# Patient Record
Sex: FEMALE | Race: WHITE | URBAN - METROPOLITAN AREA
[De-identification: names, ages, dates, MRNs, and addresses within clinical notes are randomized per-mention and may not be internally consistent; named-entity substitution may affect disease eponyms.]

---

## 2021-04-06 ENCOUNTER — OFFICE VISIT (OUTPATIENT)
Dept: URBAN - METROPOLITAN AREA CLINIC 82 | Facility: CLINIC | Age: 26
End: 2021-04-06
Payer: COMMERCIAL

## 2021-04-06 DIAGNOSIS — H52.13 MYOPIA, BILATERAL: Primary | ICD-10-CM

## 2021-04-06 PROCEDURE — 92004 COMPRE OPH EXAM NEW PT 1/>: CPT | Performed by: OPTOMETRIST

## 2021-04-06 ASSESSMENT — VISUAL ACUITY
OS: 20/20
OD: 20/20

## 2021-04-06 ASSESSMENT — KERATOMETRY
OS: 45.50
OD: 45.38

## 2021-04-06 ASSESSMENT — INTRAOCULAR PRESSURE
OS: 14
OD: 17

## 2021-04-06 NOTE — IMPRESSION/PLAN
Impression: Myopia, bilateral: H52.13. Plan: Discussed diagnosis with patient. Dispensed new Spec Rx today for full time wear. Patient does not want to update CL Rx today.